# Patient Record
Sex: FEMALE | Race: WHITE | Employment: PART TIME | URBAN - METROPOLITAN AREA
[De-identification: names, ages, dates, MRNs, and addresses within clinical notes are randomized per-mention and may not be internally consistent; named-entity substitution may affect disease eponyms.]

---

## 2018-04-03 ENCOUNTER — HOSPITAL ENCOUNTER (OUTPATIENT)
Dept: NUTRITION | Age: 58
Discharge: HOME | End: 2018-04-03
Payer: COMMERCIAL

## 2018-04-03 PROCEDURE — 97802 MEDICAL NUTRITION INDIV IN: CPT

## 2018-04-03 NOTE — PROGRESS NOTES
[address] [place] [address] 51, 45 [facility], [place], 70 [facility]  Phone: [phone]  Fax: [phone]   Nutrition Assessment  Medical Nutrition Therapy   Outpatient Initial Evaluation         Patient Name: Hector Hernandez : 1960   Treatment Diagnosis: Morbid Obesity & Prediabetes   Referral Source: Ari Stearns MD Start of Care [facility]): 4/3/2018     Gender: female Age: 56 y.o. Ht: 62 in Wt: 249.4  lb   BMI: 45.6 BMR   Male  BMR Female 1680   Anthropometrics Assessment: Per BMI, pt is considered morbidly obese with significant abdominal adiposity is evident based on visual observation     Past Medical History includes: High Cholesterol, Hypertension, Prediabetes and Morbid Obesity     Pertinent Medications:    Pain medication   Biochemical Data:   FBS- 103, HgbA1c 5.8, Cholesterol 229, , HDL 57,      Subjective/Assessment:   57 yo female with morbid obesity, weighed 113 lbs. 10 years ago, stopped smoking and gradually gained weight.  works 8+ hours per day, had gestational diabetes, walks an average of 5 to 7K steps per day however, does not exercise regularly. Nutrition Dx: Excessive energy intake related to nutrition knowledge deficit and failure to adjust lifestyle changes/decreased metabolism as evident by morbid obesity, prediabetes, high cholesterol and reported intake of high sugar, high fat foods with poor dietary fiber intake. Current Eating Patterns: BF 0700: White Chocolate [place] Mocha 16 oz., Lunch 1130: Soup, Salad, or Left overs, lemonade or punch, snack 1500 chips or candy, Gatorade, Dinner [place] or chicken, Potatoes, veggies on occasion. Drinks 1 Large [place] with milk 20 oz. Per night. Pt consumes a high fat, high sugar diet, low in fiber and goes long periods without eating.  Pt does not eat breakfast.      Estimate Nutrition Needs   Calories: 1600  Protein: 131 Carbs: 136 Fat: 70   Kcal/day g/day  g/day  g/day        percent: 25  39  30               Education & Recommendations provided: Discussed the Healthy Plate Method and appropriate portion sizes of different food groups. Explained the importance of combining carbohydrates with protein at meals and reviewed foods that contain each nutrient. Emphasized the importance of consistent meal time intervals throughout the day to improve metabolism. Explained calorie density and empty calories to assist pt with understanding portion sizes and limiting excess calories. Review food labels and added sugars in foods. Showed pt ChooseMyPlate web site and how to identify hidden sugar in foods. Educated pt macronutrient composition of various foods and provided specific recommendations for intake at each scheduled meal and snack. Also discussed the importance of establishing a consistent lifestyle and eating schedule to promote a more efficient metabolism. Encouraged pt to drink calorie free or very low calorie/carb beverages only. Developed a rough meal plan with patient, including meal and snack ideas, provided recipe suggestions and described optimal timing of meals. Pt expressed comprehension, high motivation, and compliance is expected. Invited pt to [employer] cooking classes to learn how to create simple healthy meals at home, schedule provided.       Handouts Provided: []  Carbohydrates  []  Protein  []  Fiber  [x]  Healthy Plate Method  [x]  Meal and Snack Ideas  []  Food Journals [x]  Diabetes (next visit)  [x]  Facts about fats (next visit)  []  List of non-starchy vegetables  []  Gen Nutr Guidelines  []  SBGM Guidelines  [x]  Others: cholesterol & sodium next visit   Information Reviewed with: Patient and her 12year old daughter   Readiness to Change Stage: []  Pre-contemplative    [x]  Contemplative  []  Preparation               []  Action                  []  Maintenance   Potential Barriers to Learning: []  Decline in memory []  Language barrier   []  Other:  []  Emotional                  []  Limited mobility      [x]  None  []  Lack of motivation     [] Vision, hearing or cognitive impairment   Expected Compliance: Fair: While pt is motivated, sedentary lifestyle and long term use of high fat, high sugar foods will require significant future coaching to overcome. Nutritional Goal - To promote lifestyle changes to result in:    [x]  Weight loss  [x]  Improved blood glucose control  [x]  Decreased cholesterol levels  []  Decreased blood pressure  []  Weight maintenance []  Preventing any interactions associated with food allergies  []  Adequate weight gain toward goal weight  []  Other:        Patient Goals: 1. 5 lbs weight loss by next visit in 3 weeks  2. Eat Breakfast Daily within 2 hours of waking, follow food plan suggestions  3.  Follow Draft Meal Plan daily until next visit focusing on frequency and increasing awareness around added sugars in diet     Dietitian Signature: Silvestre Marquez MPH, RDN Date: 4/3/2018   Follow-up: May 3rd, 2018 @ 1130 Time: 10:35 AM